# Patient Record
Sex: FEMALE | Race: BLACK OR AFRICAN AMERICAN | NOT HISPANIC OR LATINO | URBAN - METROPOLITAN AREA
[De-identification: names, ages, dates, MRNs, and addresses within clinical notes are randomized per-mention and may not be internally consistent; named-entity substitution may affect disease eponyms.]

---

## 2020-11-19 ENCOUNTER — OFFICE VISIT (OUTPATIENT)
Dept: URBAN - METROPOLITAN AREA CLINIC 118 | Facility: CLINIC | Age: 30
End: 2020-11-19
Payer: COMMERCIAL

## 2020-11-19 DIAGNOSIS — K64.8 INTERNAL HEMORRHOID: ICD-10-CM

## 2020-11-19 DIAGNOSIS — K62.5 RECTAL BLEEDING: ICD-10-CM

## 2020-11-19 PROCEDURE — 46221 LIGATION OF HEMORRHOID(S): CPT | Performed by: INTERNAL MEDICINE

## 2020-11-19 PROCEDURE — G8418 CALC BMI BLW LOW PARAM F/U: HCPCS | Performed by: INTERNAL MEDICINE

## 2020-11-19 PROCEDURE — G8482 FLU IMMUNIZE ORDER/ADMIN: HCPCS | Performed by: INTERNAL MEDICINE

## 2020-11-19 PROCEDURE — G9903 PT SCRN TBCO ID AS NON USER: HCPCS | Performed by: INTERNAL MEDICINE

## 2020-11-19 PROCEDURE — 99203 OFFICE O/P NEW LOW 30 MIN: CPT | Performed by: INTERNAL MEDICINE

## 2020-11-19 PROCEDURE — 1036F TOBACCO NON-USER: CPT | Performed by: INTERNAL MEDICINE

## 2020-11-19 PROCEDURE — G8427 DOCREV CUR MEDS BY ELIG CLIN: HCPCS | Performed by: INTERNAL MEDICINE

## 2020-11-19 NOTE — EXAM-PHYSICAL EXAM
kwan in room as chaperone Rectal exam: normal external exam  Anoscopy performed using self lighted anoscope Grade 1 LL. Posterior facing rectum   Banded LL using Relead system without complication after reviewing R/B/A with the patient

## 2020-11-19 NOTE — HPI-TODAY'S VISIT:
This is a pleasant but anxious 30-year-old female who presents for evaluation of rectal bleeding.  The patient reports that she was eating a lot of fast food intake amount of ibuprofen and not drinking of water she been constipated earlier this year and started to have rectal bleeding.  She reports that she is significant improved her eating habits however she still is having intermittent rectal bleeding She reports she will have bright red blood per rectum with toilet blood on the toilet paper as well as in the bowl none in the stool stool itself is normal she will also sometimes see a clear discharge when she wipes.  She denies any significant abdominal pain change in bowel habits weight loss or change in stool caliber.  She denies family history of personal history colon cancer or colon polyps

## 2020-11-23 ENCOUNTER — OFFICE VISIT (OUTPATIENT)
Dept: URBAN - METROPOLITAN AREA CLINIC 40 | Facility: CLINIC | Age: 30
End: 2020-11-23

## 2021-02-22 ENCOUNTER — OFFICE VISIT (OUTPATIENT)
Dept: URBAN - METROPOLITAN AREA CLINIC 118 | Facility: CLINIC | Age: 31
End: 2021-02-22
Payer: COMMERCIAL

## 2021-02-22 DIAGNOSIS — K64.8 INTERNAL HEMORRHOID: ICD-10-CM

## 2021-02-22 DIAGNOSIS — R10.30 LOWER ABDOMINAL PAIN: ICD-10-CM

## 2021-02-22 DIAGNOSIS — R15.2 FECAL URGENCY: ICD-10-CM

## 2021-02-22 DIAGNOSIS — K62.5 RECTAL BLEEDING: ICD-10-CM

## 2021-02-22 PROCEDURE — 99214 OFFICE O/P EST MOD 30 MIN: CPT | Performed by: INTERNAL MEDICINE

## 2021-02-22 RX ORDER — DICYCLOMINE HYDROCHLORIDE 10 MG/1
1 TABLET CAPSULE ORAL THREE TIMES A DAY
Qty: 90 TABLET | Refills: 1 | OUTPATIENT
Start: 2021-02-22 | End: 2021-04-23

## 2021-02-22 NOTE — HPI-TODAY'S VISIT:
This is a pleasant but anxious 30-year-old female who returns for evaluation of rectal bleeding.  The patient reports that she has been eating much healthier  had been doing better after the hemorrhoid banding x 1 I did then a few weeks later symptoms "went south" constant lower abd pain that is worse with eating and better with BM, cramping, severe, will last until has bm will see blood when wipes about twice a week anal burning

## 2021-03-11 ENCOUNTER — OFFICE VISIT (OUTPATIENT)
Dept: URBAN - METROPOLITAN AREA SURGERY CENTER 23 | Facility: SURGERY CENTER | Age: 31
End: 2021-03-11

## 2021-03-11 RX ORDER — DICYCLOMINE HYDROCHLORIDE 10 MG/1
1 TABLET CAPSULE ORAL THREE TIMES A DAY
Qty: 90 TABLET | Refills: 1 | Status: ACTIVE | COMMUNITY
Start: 2021-02-22 | End: 2021-04-23

## 2021-03-11 RX ORDER — SODIUM, POTASSIUM,MAG SULFATES 17.5-3.13G
177ML SOLUTION, RECONSTITUTED, ORAL ORAL
Qty: 1 KIT | Refills: 0 | OUTPATIENT
Start: 2021-03-11 | End: 2021-03-12

## 2021-03-25 ENCOUNTER — OFFICE VISIT (OUTPATIENT)
Dept: URBAN - METROPOLITAN AREA SURGERY CENTER 23 | Facility: SURGERY CENTER | Age: 31
End: 2021-03-25

## 2021-03-29 ENCOUNTER — OFFICE VISIT (OUTPATIENT)
Dept: URBAN - METROPOLITAN AREA CLINIC 98 | Facility: CLINIC | Age: 31
End: 2021-03-29
Payer: COMMERCIAL

## 2021-03-29 VITALS
HEIGHT: 64 IN | TEMPERATURE: 98 F | HEART RATE: 69 BPM | WEIGHT: 107.6 LBS | BODY MASS INDEX: 18.37 KG/M2 | DIASTOLIC BLOOD PRESSURE: 73 MMHG | SYSTOLIC BLOOD PRESSURE: 108 MMHG

## 2021-03-29 DIAGNOSIS — K59.09 CHRONIC CONSTIPATION: ICD-10-CM

## 2021-03-29 DIAGNOSIS — K62.89 ANORECTAL PAIN: ICD-10-CM

## 2021-03-29 DIAGNOSIS — K92.1 HEMATOCHEZIA: ICD-10-CM

## 2021-03-29 PROCEDURE — 992 APS NON BILLABLE: Performed by: INTERNAL MEDICINE

## 2021-03-29 RX ORDER — DICYCLOMINE HYDROCHLORIDE 10 MG/1
1 TABLET CAPSULE ORAL THREE TIMES A DAY
Qty: 90 TABLET | Refills: 1 | Status: ACTIVE | COMMUNITY
Start: 2021-02-22 | End: 2021-04-23

## 2021-03-29 RX ORDER — LIDOCAINE 50 MG/G
1 APPLICATION AS NEEDED OINTMENT TOPICAL THREE TIMES A DAY
Qty: 1 | Refills: 0 | OUTPATIENT
Start: 2021-03-29

## 2021-03-29 NOTE — EXAM-FUNCTIONAL ASSESSMENT
Constitutional: well-developed, normal communication ability.   Eyes: Conjunctivae and eyelids appear normal, no scleral icterus. Respiratory: symmetric expansion of chest wall, normal work of breathing   Musculoskeletal: normal gait and station   Skin: no jaundice   Neurologic: Oriented to person, place, time. Short term memory intact.    Psychiatric: Normal mood and appropriate affect.

## 2021-03-29 NOTE — HPI-TODAY'S VISIT:
Ms. Melo is a 31 yo F who has been seeing Dr. Sandoval last on 2/22/2021 for rectal bleeding and lower abdominal pain. She has had hemorrhoid banding x 1 with him. She was to start low FODMAP diet and have a colonoscopy (did not prep) rescheduled for 4/8/21.   Since the last visit she has felt some burning in her anus when having a BM. She has felt constipated. She is having a BM every 4-5 days. She has tried fiber supplements and changing her diet with no effect. She has not tried miralax or another laxative. She still sees some red blood on the toilet paper when she wipes most days. Abdominal pain is b/l lower abdomen and 3/10. It is crampy and sharp.

## 2021-04-08 ENCOUNTER — OFFICE VISIT (OUTPATIENT)
Dept: URBAN - METROPOLITAN AREA SURGERY CENTER 23 | Facility: SURGERY CENTER | Age: 31
End: 2021-04-08

## 2021-04-29 ENCOUNTER — OFFICE VISIT (OUTPATIENT)
Dept: URBAN - METROPOLITAN AREA SURGERY CENTER 23 | Facility: SURGERY CENTER | Age: 31
End: 2021-04-29
Payer: COMMERCIAL

## 2021-04-29 DIAGNOSIS — K62.5 ANAL BLEEDING: ICD-10-CM

## 2021-04-29 PROCEDURE — 45378 DIAGNOSTIC COLONOSCOPY: CPT | Performed by: INTERNAL MEDICINE

## 2021-04-29 PROCEDURE — G8907 PT DOC NO EVENTS ON DISCHARG: HCPCS | Performed by: INTERNAL MEDICINE

## 2021-04-29 RX ORDER — LIDOCAINE 50 MG/G
1 APPLICATION AS NEEDED OINTMENT TOPICAL THREE TIMES A DAY
Qty: 1 | Refills: 0 | Status: ACTIVE | COMMUNITY
Start: 2021-03-29

## 2021-06-17 ENCOUNTER — TELEPHONE ENCOUNTER (OUTPATIENT)
Dept: URBAN - METROPOLITAN AREA CLINIC 118 | Facility: CLINIC | Age: 31
End: 2021-06-17

## 2021-06-29 ENCOUNTER — LAB OUTSIDE AN ENCOUNTER (OUTPATIENT)
Dept: URBAN - METROPOLITAN AREA CLINIC 92 | Facility: CLINIC | Age: 31
End: 2021-06-29

## 2021-06-29 ENCOUNTER — TELEPHONE ENCOUNTER (OUTPATIENT)
Dept: URBAN - METROPOLITAN AREA CLINIC 92 | Facility: CLINIC | Age: 31
End: 2021-06-29

## 2021-07-13 ENCOUNTER — CLAIMS CREATED FROM THE CLAIM WINDOW (OUTPATIENT)
Dept: URBAN - METROPOLITAN AREA CLINIC 4 | Facility: CLINIC | Age: 31
End: 2021-07-13
Payer: COMMERCIAL

## 2021-07-13 ENCOUNTER — OFFICE VISIT (OUTPATIENT)
Dept: URBAN - METROPOLITAN AREA SURGERY CENTER 23 | Facility: SURGERY CENTER | Age: 31
End: 2021-07-13
Payer: COMMERCIAL

## 2021-07-13 DIAGNOSIS — B37.81 CANDIDA ESOPHAGITIS: ICD-10-CM

## 2021-07-13 DIAGNOSIS — K29.60 OTHER GASTRITIS WITHOUT BLEEDING: ICD-10-CM

## 2021-07-13 DIAGNOSIS — K21.9 ACID REFLUX: ICD-10-CM

## 2021-07-13 DIAGNOSIS — B37.81 CANDIDAL ESOPHAGITIS: ICD-10-CM

## 2021-07-13 DIAGNOSIS — B96.81 BACTERIAL INFECTION DUE TO H. PYLORI: ICD-10-CM

## 2021-07-13 DIAGNOSIS — K29.60 ADENOPAPILLOMATOSIS GASTRICA: ICD-10-CM

## 2021-07-13 DIAGNOSIS — B96.81 HELICOBACTER PYLORI [H. PYLORI] AS THE CAUSE OF DISEASES CLASSIFIED ELSEWHERE: ICD-10-CM

## 2021-07-13 PROCEDURE — 88341 IMHCHEM/IMCYTCHM EA ADD ANTB: CPT | Performed by: PATHOLOGY

## 2021-07-13 PROCEDURE — G8907 PT DOC NO EVENTS ON DISCHARG: HCPCS | Performed by: INTERNAL MEDICINE

## 2021-07-13 PROCEDURE — 88312 SPECIAL STAINS GROUP 1: CPT | Performed by: PATHOLOGY

## 2021-07-13 PROCEDURE — 88342 IMHCHEM/IMCYTCHM 1ST ANTB: CPT | Performed by: PATHOLOGY

## 2021-07-13 PROCEDURE — 43251 EGD REMOVE LESION SNARE: CPT | Performed by: INTERNAL MEDICINE

## 2021-07-13 PROCEDURE — 88305 TISSUE EXAM BY PATHOLOGIST: CPT | Performed by: PATHOLOGY

## 2021-07-13 PROCEDURE — 43239 EGD BIOPSY SINGLE/MULTIPLE: CPT | Performed by: INTERNAL MEDICINE

## 2021-07-13 RX ORDER — LIDOCAINE 50 MG/G
1 APPLICATION AS NEEDED OINTMENT TOPICAL THREE TIMES A DAY
Qty: 1 | Refills: 0 | Status: ACTIVE | COMMUNITY
Start: 2021-03-29

## 2021-07-13 RX ORDER — CLARITHROMYCIN 500 MG/1
1 TABLET TABLET, FILM COATED ORAL
Qty: 28 TABLET | Refills: 0 | OUTPATIENT
Start: 2021-07-26 | End: 2021-08-09

## 2021-07-13 RX ORDER — OMEPRAZOLE 20 MG/1
1 CAPSULE CAPSULE, DELAYED RELEASE ORAL BID
Qty: 28 CAPSULE | Refills: 0 | OUTPATIENT
Start: 2021-07-26

## 2021-07-13 RX ORDER — AMOXICILLIN 500 MG/1
2 CAPSULES CAPSULE ORAL
Qty: 56 CAPSULE | Refills: 0 | OUTPATIENT
Start: 2021-07-26 | End: 2021-08-09

## 2021-08-09 ENCOUNTER — TELEPHONE ENCOUNTER (OUTPATIENT)
Dept: URBAN - METROPOLITAN AREA CLINIC 92 | Facility: CLINIC | Age: 31
End: 2021-08-09

## 2021-08-18 ENCOUNTER — OFFICE VISIT (OUTPATIENT)
Dept: URBAN - METROPOLITAN AREA CLINIC 118 | Facility: CLINIC | Age: 31
End: 2021-08-18

## 2021-08-18 VITALS — HEIGHT: 64 IN

## 2021-09-10 ENCOUNTER — OFFICE VISIT (OUTPATIENT)
Dept: URBAN - METROPOLITAN AREA CLINIC 109 | Facility: CLINIC | Age: 31
End: 2021-09-10
Payer: COMMERCIAL

## 2021-09-10 ENCOUNTER — LAB OUTSIDE AN ENCOUNTER (OUTPATIENT)
Dept: URBAN - METROPOLITAN AREA CLINIC 109 | Facility: CLINIC | Age: 31
End: 2021-09-10

## 2021-09-10 ENCOUNTER — WEB ENCOUNTER (OUTPATIENT)
Dept: URBAN - METROPOLITAN AREA CLINIC 109 | Facility: CLINIC | Age: 31
End: 2021-09-10

## 2021-09-10 DIAGNOSIS — R64 CACHECTIC: ICD-10-CM

## 2021-09-10 DIAGNOSIS — B37.81 CANDIDA ESOPHAGITIS: ICD-10-CM

## 2021-09-10 DIAGNOSIS — R63.4 ABNORMAL LOSS OF WEIGHT: ICD-10-CM

## 2021-09-10 DIAGNOSIS — A04.8 H. PYLORI INFECTION: ICD-10-CM

## 2021-09-10 PROCEDURE — 99214 OFFICE O/P EST MOD 30 MIN: CPT | Performed by: INTERNAL MEDICINE

## 2021-09-10 RX ORDER — FLUCONAZOLE 200 MG/1
2 TABS ON DAY ONE, THEN 1 TABLET DAILY THEREAFTER TABLET ORAL DAILY
Qty: 22 TABLET | Refills: 0 | OUTPATIENT
Start: 2021-09-10 | End: 2021-10-01

## 2021-09-10 RX ORDER — OMEPRAZOLE 20 MG/1
1 CAPSULE CAPSULE, DELAYED RELEASE ORAL BID
Qty: 28 CAPSULE | Refills: 0 | Status: DISCONTINUED | COMMUNITY
Start: 2021-07-26

## 2021-09-10 RX ORDER — LIDOCAINE 50 MG/G
1 APPLICATION AS NEEDED OINTMENT TOPICAL THREE TIMES A DAY
Qty: 1 | Refills: 0 | Status: DISCONTINUED | COMMUNITY
Start: 2021-03-29

## 2021-09-10 NOTE — HPI-TODAY'S VISIT:
HP pos, so sent in abx, she feels much better overall but still losing weight despite eating well and feeling improving overall also candida, will treat at followup consider testing why has candida  LAST NOTE: Ms. Melo is a 29 yo F who has been seeing Dr. Sandoval last on 2/22/2021 for rectal bleeding and lower abdominal pain. She has had hemorrhoid banding x 1 with him. She was to start low FODMAP diet and have a colonoscopy (did not prep) rescheduled for 4/8/21.   Since the last visit she has felt some burning in her anus when having a BM. She has felt constipated. She is having a BM every 4-5 days. She has tried fiber supplements and changing her diet with no effect. She has not tried miralax or another laxative. She still sees some red blood on the toilet paper when she wipes most days. Abdominal pain is b/l lower abdomen and 3/10. It is crampy and sharp.

## 2021-09-21 LAB
DEAMIDATED GLIADIN ABS, IGA: 4
DEAMIDATED GLIADIN ABS, IGG: 5
ENDOMYSIAL ANTIBODY IGA: NEGATIVE
H. PYLORI STOOL AG, EIA: NEGATIVE
HEMATOCRIT: 37.2
HEMOGLOBIN: 11.8
HIV SCREEN 4TH GENERATION WRFX: NON REACTIVE
IMMUNOGLOBULIN A, QN, SERUM: 133
MCH: 27.1
MCHC: 31.7
MCV: 86
NRBC: (no result)
PLATELETS: 284
RBC: 4.35
RDW: 14.7
T-TRANSGLUTAMINASE (TTG) IGA: 4
T-TRANSGLUTAMINASE (TTG) IGG: 4
T4,FREE(DIRECT): 1.1
TSH: 1.28
WBC: 4.1

## 2021-11-24 ENCOUNTER — OFFICE VISIT (OUTPATIENT)
Dept: URBAN - METROPOLITAN AREA CLINIC 118 | Facility: CLINIC | Age: 31
End: 2021-11-24
Payer: COMMERCIAL

## 2021-11-24 VITALS
HEIGHT: 64 IN | SYSTOLIC BLOOD PRESSURE: 111 MMHG | HEART RATE: 90 BPM | BODY MASS INDEX: 17.48 KG/M2 | DIASTOLIC BLOOD PRESSURE: 73 MMHG | WEIGHT: 102.4 LBS | TEMPERATURE: 98.1 F

## 2021-11-24 DIAGNOSIS — B37.81 CANDIDA ESOPHAGITIS: ICD-10-CM

## 2021-11-24 DIAGNOSIS — Z86.19 HISTORY OF HELICOBACTER PYLORI INFECTION: ICD-10-CM

## 2021-11-24 DIAGNOSIS — R64 CACHECTIC: ICD-10-CM

## 2021-11-24 DIAGNOSIS — R63.4 ABNORMAL LOSS OF WEIGHT: ICD-10-CM

## 2021-11-24 PROCEDURE — 99214 OFFICE O/P EST MOD 30 MIN: CPT | Performed by: INTERNAL MEDICINE

## 2021-11-24 RX ORDER — AMITRIPTYLINE HYDROCHLORIDE 25 MG/1
1 TAB QHS; IF NOT BETTER AND NO SIDE EFFECTS INCREASE TO 2 TABS QHS TABLET, FILM COATED ORAL ONCE A DAY
Qty: 60 | Refills: 1 | OUTPATIENT
Start: 2021-11-24

## 2021-11-24 NOTE — HPI-TODAY'S VISIT:
Last seen 9/2021 for followup from HP pos, so sent in abx, she feels much better overall but still losing weight despite eating well and feeling improving overall also candida, I treated as well   confirmed HP eradication also constipated checked celiac, tTG IgA weakly positive (since had gastritis and candida on the recent EGD 7/2021 didn't test for ceilac) she went gluten fres and reports gained one pound saw rheum, told may have sjogren's   LAST NOTE: Ms. Melo is a 31 yo F who has been seeing Dr. Sandoval last on 2/22/2021 for rectal bleeding and lower abdominal pain. She has had hemorrhoid banding x 1 with him. She was to start low FODMAP diet and have a colonoscopy (did not prep) rescheduled for 4/8/21.   Since the last visit she has felt some burning in her anus when having a BM. She has felt constipated. She is having a BM every 4-5 days. She has tried fiber supplements and changing her diet with no effect. She has not tried miralax or another laxative. She still sees some red blood on the toilet paper when she wipes most days. Abdominal pain is b/l lower abdomen and 3/10. It is crampy and sharp.

## 2021-12-07 ENCOUNTER — OFFICE VISIT (OUTPATIENT)
Dept: URBAN - METROPOLITAN AREA TELEHEALTH 2 | Facility: TELEHEALTH | Age: 31
End: 2021-12-07
Payer: COMMERCIAL

## 2021-12-07 DIAGNOSIS — B37.81 CANDIDA ESOPHAGITIS: ICD-10-CM

## 2021-12-07 DIAGNOSIS — K58.9 IBS (IRRITABLE BOWEL SYNDROME): ICD-10-CM

## 2021-12-07 DIAGNOSIS — R89.4 ABNORMAL CELIAC ANTIBODY PANEL: ICD-10-CM

## 2021-12-07 PROCEDURE — 97802 MEDICAL NUTRITION INDIV IN: CPT | Performed by: DIETITIAN, REGISTERED

## 2021-12-07 RX ORDER — AMITRIPTYLINE HYDROCHLORIDE 25 MG/1
1 TAB QHS; IF NOT BETTER AND NO SIDE EFFECTS INCREASE TO 2 TABS QHS TABLET, FILM COATED ORAL ONCE A DAY
Qty: 60 | Refills: 1 | Status: ACTIVE | COMMUNITY
Start: 2021-11-24

## 2022-01-13 ENCOUNTER — TELEPHONE ENCOUNTER (OUTPATIENT)
Dept: URBAN - METROPOLITAN AREA CLINIC 118 | Facility: CLINIC | Age: 32
End: 2022-01-13

## 2022-01-18 ENCOUNTER — TELEPHONE ENCOUNTER (OUTPATIENT)
Dept: URBAN - METROPOLITAN AREA CLINIC 118 | Facility: CLINIC | Age: 32
End: 2022-01-18

## 2022-02-28 ENCOUNTER — OFFICE VISIT (OUTPATIENT)
Dept: URBAN - METROPOLITAN AREA CLINIC 118 | Facility: CLINIC | Age: 32
End: 2022-02-28
Payer: COMMERCIAL

## 2022-02-28 VITALS
SYSTOLIC BLOOD PRESSURE: 122 MMHG | HEART RATE: 84 BPM | DIASTOLIC BLOOD PRESSURE: 80 MMHG | HEIGHT: 64 IN | WEIGHT: 103 LBS | TEMPERATURE: 97.5 F | BODY MASS INDEX: 17.58 KG/M2

## 2022-02-28 DIAGNOSIS — B37.81 CANDIDA ESOPHAGITIS: ICD-10-CM

## 2022-02-28 DIAGNOSIS — Z86.19 HISTORY OF HELICOBACTER PYLORI INFECTION: ICD-10-CM

## 2022-02-28 DIAGNOSIS — R64 CACHECTIC: ICD-10-CM

## 2022-02-28 DIAGNOSIS — R63.4 ABNORMAL LOSS OF WEIGHT: ICD-10-CM

## 2022-02-28 PROCEDURE — 99213 OFFICE O/P EST LOW 20 MIN: CPT | Performed by: INTERNAL MEDICINE

## 2022-02-28 RX ORDER — AMITRIPTYLINE HYDROCHLORIDE 25 MG/1
1 TAB QHS; IF NOT BETTER AND NO SIDE EFFECTS INCREASE TO 2 TABS QHS TABLET, FILM COATED ORAL ONCE A DAY
Qty: 60 | Refills: 1 | Status: ON HOLD | COMMUNITY
Start: 2021-11-24
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
## 2022-08-08 ENCOUNTER — HOSPITAL ENCOUNTER (EMERGENCY)
Dept: HOSPITAL 5 - ED | Age: 32
Discharge: LEFT BEFORE DISCHARGE | End: 2022-08-08
Payer: SELF-PAY

## 2022-08-08 VITALS — DIASTOLIC BLOOD PRESSURE: 45 MMHG | SYSTOLIC BLOOD PRESSURE: 117 MMHG

## 2022-08-08 DIAGNOSIS — K08.89: Primary | ICD-10-CM

## 2022-08-08 DIAGNOSIS — Z53.21: ICD-10-CM

## 2022-10-06 ENCOUNTER — TELEPHONE ENCOUNTER (OUTPATIENT)
Dept: URBAN - METROPOLITAN AREA CLINIC 27 | Facility: CLINIC | Age: 32
End: 2022-10-06

## 2022-10-06 ENCOUNTER — OFFICE VISIT (OUTPATIENT)
Dept: URBAN - METROPOLITAN AREA CLINIC 27 | Facility: CLINIC | Age: 32
End: 2022-10-06
Payer: COMMERCIAL

## 2022-10-06 ENCOUNTER — LAB OUTSIDE AN ENCOUNTER (OUTPATIENT)
Dept: URBAN - METROPOLITAN AREA CLINIC 27 | Facility: CLINIC | Age: 32
End: 2022-10-06

## 2022-10-06 ENCOUNTER — WEB ENCOUNTER (OUTPATIENT)
Dept: URBAN - METROPOLITAN AREA CLINIC 27 | Facility: CLINIC | Age: 32
End: 2022-10-06

## 2022-10-06 VITALS
WEIGHT: 103 LBS | DIASTOLIC BLOOD PRESSURE: 68 MMHG | BODY MASS INDEX: 17.58 KG/M2 | HEIGHT: 64 IN | HEART RATE: 75 BPM | SYSTOLIC BLOOD PRESSURE: 103 MMHG | RESPIRATION RATE: 17 BRPM | TEMPERATURE: 96.6 F

## 2022-10-06 DIAGNOSIS — R10.12 LEFT UPPER QUADRANT ABDOMINAL PAIN: ICD-10-CM

## 2022-10-06 PROCEDURE — 99204 OFFICE O/P NEW MOD 45 MIN: CPT | Performed by: INTERNAL MEDICINE

## 2022-10-06 RX ORDER — OMEPRAZOLE 40 MG/1
1 CAPSULE 30 MINUTES BEFORE MORNING MEAL CAPSULE, DELAYED RELEASE ORAL ONCE A DAY
Qty: 30 | Refills: 5
Start: 2022-10-06

## 2022-10-06 RX ORDER — OMEPRAZOLE 40 MG/1
1 CAPSULE 30 MINUTES BEFORE MORNING MEAL CAPSULE, DELAYED RELEASE ORAL ONCE A DAY
Qty: 30 | Refills: 5 | OUTPATIENT
Start: 2022-10-06

## 2022-10-06 RX ORDER — AMITRIPTYLINE HYDROCHLORIDE 25 MG/1
1 TAB QHS; IF NOT BETTER AND NO SIDE EFFECTS INCREASE TO 2 TABS QHS TABLET, FILM COATED ORAL ONCE A DAY
Qty: 60 | Refills: 1 | Status: ON HOLD | COMMUNITY
Start: 2021-11-24

## 2022-10-06 NOTE — HPI-TODAY'S VISIT:
32-year-old female here for abdominal pain.  Pain is predominantly in the left upper quadrant.  She has a history of H. pylori diagnosed by EGD last year.  She was treated with triple therapy and stool test confirmed eradication after completing antibiotics.  Symptoms returned about 2 to 3 weeks ago and are very similar to when she had H. pylori.  She has had occasional rectal bleeding.  She has a history of hemorrhoids, underwent banding about 2 years ago.  Last colonoscopy was last year that showed small internal hemorrhoids.  No family history of colon cancer. Had CT earlier this year that was normal.

## 2022-10-10 ENCOUNTER — LAB OUTSIDE AN ENCOUNTER (OUTPATIENT)
Dept: URBAN - METROPOLITAN AREA CLINIC 27 | Facility: CLINIC | Age: 32
End: 2022-10-10

## 2022-10-10 ENCOUNTER — CLAIMS CREATED FROM THE CLAIM WINDOW (OUTPATIENT)
Dept: URBAN - METROPOLITAN AREA SURGERY CENTER 7 | Facility: SURGERY CENTER | Age: 32
End: 2022-10-10

## 2022-10-10 ENCOUNTER — CLAIMS CREATED FROM THE CLAIM WINDOW (OUTPATIENT)
Dept: URBAN - METROPOLITAN AREA SURGERY CENTER 7 | Facility: SURGERY CENTER | Age: 32
End: 2022-10-10
Payer: COMMERCIAL

## 2022-10-10 ENCOUNTER — TELEPHONE ENCOUNTER (OUTPATIENT)
Dept: URBAN - METROPOLITAN AREA CLINIC 27 | Facility: CLINIC | Age: 32
End: 2022-10-10

## 2022-10-10 DIAGNOSIS — K29.60 ADENOPAPILLOMATOSIS GASTRICA: ICD-10-CM

## 2022-10-10 DIAGNOSIS — K22.9 ABNORMALITY OF ESOPHAGUS: ICD-10-CM

## 2022-10-10 DIAGNOSIS — R10.12 ABDOMINAL BURNING SENSATION IN LEFT UPPER QUADRANT: ICD-10-CM

## 2022-10-10 PROCEDURE — 43239 EGD BIOPSY SINGLE/MULTIPLE: CPT | Performed by: INTERNAL MEDICINE

## 2022-10-10 PROCEDURE — G8907 PT DOC NO EVENTS ON DISCHARG: HCPCS | Performed by: INTERNAL MEDICINE

## 2022-10-10 RX ORDER — AMITRIPTYLINE HYDROCHLORIDE 25 MG/1
1 TAB QHS; IF NOT BETTER AND NO SIDE EFFECTS INCREASE TO 2 TABS QHS TABLET, FILM COATED ORAL ONCE A DAY
Qty: 60 | Refills: 1 | Status: ON HOLD | COMMUNITY
Start: 2021-11-24

## 2022-10-10 RX ORDER — OMEPRAZOLE 40 MG/1
1 CAPSULE 30 MINUTES BEFORE MORNING MEAL CAPSULE, DELAYED RELEASE ORAL ONCE A DAY
Qty: 30 | Refills: 5 | Status: ACTIVE | COMMUNITY
Start: 2022-10-06

## 2022-10-10 RX ORDER — FLUCONAZOLE 200 MG/1
1 TABLET TABLET ORAL DAILY
Qty: 14 TABLET | Refills: 0 | OUTPATIENT
Start: 2022-10-10 | End: 2022-10-24

## 2022-10-11 LAB
A/G RATIO: 1.8
ABSOLUTE BASOPHILS: 19
ABSOLUTE EOSINOPHILS: 103
ABSOLUTE LYMPHOCYTES: 1794
ABSOLUTE MONOCYTES: 194
ABSOLUTE NEUTROPHILS: 1691
ALBUMIN: 4.2
ALKALINE PHOSPHATASE: 33
ALT (SGPT): 14
AST (SGOT): 16
BASOPHILS: 0.5
BILIRUBIN, TOTAL: 0.7
BUN/CREATININE RATIO: (no result)
BUN: 12
CALCIUM: 8.6
CARBON DIOXIDE, TOTAL: 23
CHLORIDE: 106
CREATININE: 0.67
EGFR: 120
EOSINOPHILS: 2.7
GLOBULIN, TOTAL: 2.4
GLUCOSE: 74
HEMATOCRIT: 35.9
HEMOGLOBIN: 11.7
HIV AG/AB, 4TH GEN: (no result)
LYMPHOCYTES: 47.2
MCH: 28.5
MCHC: 32.6
MCV: 87.3
MONOCYTES: 5.1
MPV: 10
NEUTROPHILS: 44.5
PLATELET COUNT: 274
POTASSIUM: 4.1
PROTEIN, TOTAL: 6.6
RDW: 14.2
RED BLOOD CELL COUNT: 4.11
SODIUM: 139
TSH: 3.21
WHITE BLOOD CELL COUNT: 3.8

## 2023-02-15 ENCOUNTER — OFFICE VISIT (OUTPATIENT)
Dept: URBAN - METROPOLITAN AREA CLINIC 109 | Facility: CLINIC | Age: 33
End: 2023-02-15
Payer: COMMERCIAL

## 2023-02-15 VITALS
SYSTOLIC BLOOD PRESSURE: 106 MMHG | HEIGHT: 64 IN | TEMPERATURE: 97 F | HEART RATE: 92 BPM | BODY MASS INDEX: 17.69 KG/M2 | WEIGHT: 103.6 LBS | DIASTOLIC BLOOD PRESSURE: 65 MMHG

## 2023-02-15 DIAGNOSIS — D84.9 IMMUNOSUPPRESSION: ICD-10-CM

## 2023-02-15 DIAGNOSIS — R13.10 ODYNOPHAGIA: ICD-10-CM

## 2023-02-15 DIAGNOSIS — Z86.19 HISTORY OF CANDIDIASIS: ICD-10-CM

## 2023-02-15 PROBLEM — 38013005: Status: ACTIVE | Noted: 2023-02-15

## 2023-02-15 PROBLEM — 161413004: Status: ACTIVE | Noted: 2023-02-15

## 2023-02-15 PROBLEM — 30233002: Status: ACTIVE | Noted: 2023-02-15

## 2023-02-15 PROCEDURE — 99214 OFFICE O/P EST MOD 30 MIN: CPT | Performed by: INTERNAL MEDICINE

## 2023-02-15 RX ORDER — FLUCONAZOLE 200 MG/1
1 TABLET TABLET ORAL ONCE DAILY
Qty: 14 | Refills: 0 | OUTPATIENT

## 2023-02-15 RX ORDER — OMEPRAZOLE 40 MG/1
1 CAPSULE 30 MINUTES BEFORE MORNING MEAL CAPSULE, DELAYED RELEASE ORAL ONCE A DAY
Qty: 30 | Refills: 5 | Status: ON HOLD | COMMUNITY
Start: 2022-10-06

## 2023-02-15 RX ORDER — AMITRIPTYLINE HYDROCHLORIDE 25 MG/1
1 TAB QHS; IF NOT BETTER AND NO SIDE EFFECTS INCREASE TO 2 TABS QHS TABLET, FILM COATED ORAL ONCE A DAY
Qty: 60 | Refills: 1 | COMMUNITY
Start: 2021-11-24

## 2023-02-15 NOTE — HPI-TODAY'S VISIT:
The patient presents for f/u appt.  has seen other providers in practice in past few years.  h/o HP gastritis, and recently dx with candida esophagitis from endoscopy last year.  She was treated with course of fluconazole.  she had odynophagia sx's previously that resolved after fluconazole course, but sx's returned 2-3 weeks ago.  + pain with swallowing, similar to prior sx's.  h/o sjogren's syndrome, on plaquenil.  weight stable from last appt.  denies bowel habit changes, abd pain or gi bleeding.

## 2023-03-08 ENCOUNTER — OFFICE VISIT (OUTPATIENT)
Dept: URBAN - METROPOLITAN AREA CLINIC 109 | Facility: CLINIC | Age: 33
End: 2023-03-08

## 2023-03-08 RX ORDER — AMITRIPTYLINE HYDROCHLORIDE 25 MG/1
1 TAB QHS; IF NOT BETTER AND NO SIDE EFFECTS INCREASE TO 2 TABS QHS TABLET, FILM COATED ORAL ONCE A DAY
Qty: 60 | Refills: 1 | COMMUNITY
Start: 2021-11-24

## 2023-03-08 RX ORDER — OMEPRAZOLE 40 MG/1
1 CAPSULE 30 MINUTES BEFORE MORNING MEAL CAPSULE, DELAYED RELEASE ORAL ONCE A DAY
Qty: 30 | Refills: 5 | Status: ON HOLD | COMMUNITY
Start: 2022-10-06

## 2023-03-08 RX ORDER — FLUCONAZOLE 200 MG/1
1 TABLET TABLET ORAL ONCE DAILY
Qty: 14 | Refills: 0 | Status: ACTIVE | COMMUNITY

## 2023-07-06 ENCOUNTER — OFFICE VISIT (OUTPATIENT)
Dept: URBAN - METROPOLITAN AREA CLINIC 25 | Facility: CLINIC | Age: 33
End: 2023-07-06
Payer: COMMERCIAL

## 2023-07-06 ENCOUNTER — WEB ENCOUNTER (OUTPATIENT)
Dept: URBAN - METROPOLITAN AREA CLINIC 25 | Facility: CLINIC | Age: 33
End: 2023-07-06

## 2023-07-06 VITALS
HEIGHT: 64 IN | BODY MASS INDEX: 18.13 KG/M2 | TEMPERATURE: 97.5 F | WEIGHT: 106.2 LBS | DIASTOLIC BLOOD PRESSURE: 64 MMHG | SYSTOLIC BLOOD PRESSURE: 103 MMHG | HEART RATE: 80 BPM

## 2023-07-06 DIAGNOSIS — M35.08 SJOGREN SYNDROME WITH GASTROINTESTINAL INVOLVEMENT: ICD-10-CM

## 2023-07-06 DIAGNOSIS — K62.5 RECTAL BLEEDING: ICD-10-CM

## 2023-07-06 DIAGNOSIS — K59.04 CHRONIC IDIOPATHIC CONSTIPATION: ICD-10-CM

## 2023-07-06 DIAGNOSIS — B37.81 ESOPHAGEAL CANDIDIASIS: ICD-10-CM

## 2023-07-06 PROBLEM — 82934008: Status: ACTIVE | Noted: 2023-07-06

## 2023-07-06 PROCEDURE — 99214 OFFICE O/P EST MOD 30 MIN: CPT | Performed by: INTERNAL MEDICINE

## 2023-07-06 RX ORDER — POLYETHYLENE GLYCOL 3350, SODIUM SULFATE ANHYDROUS, SODIUM BICARBONATE, SODIUM CHLORIDE, POTASSIUM CHLORIDE 236; 22.74; 6.74; 5.86; 2.97 G/4L; G/4L; G/4L; G/4L; G/4L
AS DIRECTED POWDER, FOR SOLUTION ORAL
Qty: 1 | Refills: 0 | OUTPATIENT
Start: 2023-07-06 | End: 2023-07-07

## 2023-07-06 RX ORDER — FLUCONAZOLE 200 MG/1
TAKE 2 TABLETS ON DAY 1 AND THEN 1 TABLET DAILY FOR 14 DAYS TABLET ORAL DAILY
Qty: 15 | Refills: 1 | OUTPATIENT
Start: 2023-07-06 | End: 2023-08-03

## 2023-07-06 RX ORDER — AMITRIPTYLINE HYDROCHLORIDE 25 MG/1
1 TAB QHS; IF NOT BETTER AND NO SIDE EFFECTS INCREASE TO 2 TABS QHS TABLET, FILM COATED ORAL ONCE A DAY
Qty: 60 | Refills: 1 | Status: ON HOLD | COMMUNITY
Start: 2021-11-24

## 2023-07-06 RX ORDER — OMEPRAZOLE 40 MG/1
1 CAPSULE 30 MINUTES BEFORE MORNING MEAL CAPSULE, DELAYED RELEASE ORAL ONCE A DAY
Qty: 30 | Refills: 5 | Status: ON HOLD | COMMUNITY
Start: 2022-10-06

## 2023-07-06 RX ORDER — FLUCONAZOLE 200 MG/1
1 TABLET TABLET ORAL ONCE DAILY
Qty: 14 | Refills: 0 | Status: ON HOLD | COMMUNITY

## 2023-07-06 NOTE — HPI-TODAY'S VISIT:
Ms. Melo is a 32y F, she presents today w/ LLQ abdominal pain, she confirms a hx of candidal esophagitis and she reports odynophagia w/ hx of Sjogren's  (+) Odynophagia  - Patient reports pain swallowing both solids and liquids, she only gets relief from her pain when she is sleeping, she notes oral thrush currently and states that she has continuously tried to maintain moisture in hr throat  - The last episode of candidal esophagitis was in October and she was treated w/ nystatin  - Decreased appetite and notes weight loss out of fear of inducing pain  - Patient denies heartburn or acid reflux symtpoms  (+) Abdominal pain  - Patient reports a few days w/o movement, when she does have a BM she will occasionally note rectal bleeding as well  - 2-3 BM's a week, stools are usually soft and denies straining  - Abdominal pain is described as punching in the stomach, and cramping-like pain  - Rectal bleeding occurs 2-3 times a week, blood is only when she wipes and states it is relatively scant  - Patient has not tried anything for her constipation, has attempted to increase water intake w/o much relief  - Denies famhx of colon CA/Polyps, unintentional weight loss, melena,

## 2023-07-17 ENCOUNTER — OFFICE VISIT (OUTPATIENT)
Dept: URBAN - METROPOLITAN AREA CLINIC 25 | Facility: CLINIC | Age: 33
End: 2023-07-17

## 2023-07-24 ENCOUNTER — OFFICE VISIT (OUTPATIENT)
Dept: URBAN - METROPOLITAN AREA SURGERY CENTER 20 | Facility: SURGERY CENTER | Age: 33
End: 2023-07-24

## 2023-07-24 ENCOUNTER — TELEPHONE ENCOUNTER (OUTPATIENT)
Dept: URBAN - METROPOLITAN AREA CLINIC 25 | Facility: CLINIC | Age: 33
End: 2023-07-24

## 2023-08-23 ENCOUNTER — TELEPHONE ENCOUNTER (OUTPATIENT)
Dept: URBAN - METROPOLITAN AREA CLINIC 25 | Facility: CLINIC | Age: 33
End: 2023-08-23

## 2023-09-01 ENCOUNTER — OFFICE VISIT (OUTPATIENT)
Dept: URBAN - METROPOLITAN AREA CLINIC 25 | Facility: CLINIC | Age: 33
End: 2023-09-01

## 2023-09-01 RX ORDER — AMITRIPTYLINE HYDROCHLORIDE 25 MG/1
1 TAB QHS; IF NOT BETTER AND NO SIDE EFFECTS INCREASE TO 2 TABS QHS TABLET, FILM COATED ORAL ONCE A DAY
Qty: 60 | Refills: 1 | Status: ON HOLD | COMMUNITY
Start: 2021-11-24

## 2023-09-01 RX ORDER — FLUCONAZOLE 200 MG/1
1 TABLET TABLET ORAL ONCE DAILY
Qty: 14 | Refills: 0 | Status: ON HOLD | COMMUNITY

## 2023-09-01 RX ORDER — OMEPRAZOLE 40 MG/1
1 CAPSULE 30 MINUTES BEFORE MORNING MEAL CAPSULE, DELAYED RELEASE ORAL ONCE A DAY
Qty: 30 | Refills: 5 | Status: ON HOLD | COMMUNITY
Start: 2022-10-06

## 2023-09-06 ENCOUNTER — OFFICE VISIT (OUTPATIENT)
Dept: URBAN - METROPOLITAN AREA CLINIC 25 | Facility: CLINIC | Age: 33
End: 2023-09-06

## 2023-09-15 ENCOUNTER — OFFICE VISIT (OUTPATIENT)
Dept: URBAN - METROPOLITAN AREA CLINIC 25 | Facility: CLINIC | Age: 33
End: 2023-09-15

## 2023-09-15 RX ORDER — FLUCONAZOLE 200 MG/1
1 TABLET TABLET ORAL ONCE DAILY
Qty: 14 | Refills: 0 | Status: ON HOLD | COMMUNITY

## 2023-09-15 RX ORDER — AMITRIPTYLINE HYDROCHLORIDE 25 MG/1
1 TAB QHS; IF NOT BETTER AND NO SIDE EFFECTS INCREASE TO 2 TABS QHS TABLET, FILM COATED ORAL ONCE A DAY
Qty: 60 | Refills: 1 | Status: ON HOLD | COMMUNITY
Start: 2021-11-24

## 2023-09-15 RX ORDER — OMEPRAZOLE 40 MG/1
1 CAPSULE 30 MINUTES BEFORE MORNING MEAL CAPSULE, DELAYED RELEASE ORAL ONCE A DAY
Qty: 30 | Refills: 5 | Status: ON HOLD | COMMUNITY
Start: 2022-10-06

## 2023-09-25 ENCOUNTER — TELEPHONE ENCOUNTER (OUTPATIENT)
Dept: URBAN - METROPOLITAN AREA CLINIC 25 | Facility: CLINIC | Age: 33
End: 2023-09-25

## 2023-10-30 ENCOUNTER — OFFICE VISIT (OUTPATIENT)
Dept: URBAN - METROPOLITAN AREA SURGERY CENTER 20 | Facility: SURGERY CENTER | Age: 33
End: 2023-10-30

## 2023-12-05 ENCOUNTER — WEB ENCOUNTER (OUTPATIENT)
Dept: URBAN - METROPOLITAN AREA SURGERY CENTER 7 | Facility: SURGERY CENTER | Age: 33
End: 2023-12-05

## 2023-12-22 ENCOUNTER — OFFICE VISIT (OUTPATIENT)
Dept: URBAN - METROPOLITAN AREA CLINIC 92 | Facility: CLINIC | Age: 33
End: 2023-12-22
Payer: COMMERCIAL

## 2023-12-22 VITALS
HEART RATE: 108 BPM | TEMPERATURE: 97 F | HEIGHT: 64 IN | BODY MASS INDEX: 17.42 KG/M2 | WEIGHT: 102 LBS | DIASTOLIC BLOOD PRESSURE: 82 MMHG | SYSTOLIC BLOOD PRESSURE: 117 MMHG

## 2023-12-22 DIAGNOSIS — R10.30 LOWER ABDOMINAL PAIN: ICD-10-CM

## 2023-12-22 DIAGNOSIS — K59.04 CHRONIC IDIOPATHIC CONSTIPATION: ICD-10-CM

## 2023-12-22 DIAGNOSIS — B37.81 ESOPHAGEAL CANDIDIASIS: ICD-10-CM

## 2023-12-22 DIAGNOSIS — K62.5 RECTAL BLEEDING: ICD-10-CM

## 2023-12-22 DIAGNOSIS — M35.08 SJOGREN SYNDROME WITH GASTROINTESTINAL INVOLVEMENT: ICD-10-CM

## 2023-12-22 DIAGNOSIS — R13.10 ODYNOPHAGIA: ICD-10-CM

## 2023-12-22 PROCEDURE — 99214 OFFICE O/P EST MOD 30 MIN: CPT

## 2023-12-22 RX ORDER — FLUCONAZOLE 200 MG/1
1 TABLET TABLET ORAL ONCE DAILY
Qty: 14 | Refills: 0 | Status: ON HOLD | COMMUNITY

## 2023-12-22 RX ORDER — OMEPRAZOLE 40 MG/1
1 CAPSULE 30 MINUTES BEFORE MORNING MEAL CAPSULE, DELAYED RELEASE ORAL ONCE A DAY
Qty: 30 | Refills: 5 | Status: ON HOLD | COMMUNITY
Start: 2022-10-06

## 2023-12-22 RX ORDER — AMITRIPTYLINE HYDROCHLORIDE 25 MG/1
1 TAB QHS; IF NOT BETTER AND NO SIDE EFFECTS INCREASE TO 2 TABS QHS TABLET, FILM COATED ORAL ONCE A DAY
Qty: 60 | Refills: 1 | Status: ON HOLD | COMMUNITY
Start: 2021-11-24

## 2023-12-22 RX ORDER — FLUCONAZOLE 200 MG/1
1 TABLET TABLET ORAL ONCE DAILY
Qty: 14 | Refills: 0
End: 2024-01-05

## 2023-12-22 NOTE — HPI-TODAY'S VISIT:
Patient is a 33 year old female with a PMH of Sjogren's and candidal esophagitis who presents in follow up for abdominal pain. Patient was previously seen in July by MAYTE Bui.   7/06/23 (+) Odynophagia  - Patient reports pain swallowing both solids and liquids, she only gets relief from her pain when she is sleeping, she notes oral thrush currently and states that she has continuously tried to maintain moisture in hr throat  - The last episode of candidal esophagitis was in October and she was treated w/ nystatin  - Decreased appetite and notes weight loss out of fear of inducing pain  - Patient denies heartburn or acid reflux symtpoms  (+) Abdominal pain  - Patient reports a few days w/o movement, when she does have a BM she will occasionally note rectal bleeding as well  - 2-3 BM's a week, stools are usually soft and denies straining  - Abdominal pain is described as punching in the stomach, and cramping-like pain  - Rectal bleeding occurs 2-3 times a week, blood is only when she wipes and states it is relatively scant  - Patient has not tried anything for her constipation, has attempted to increase water intake w/o much relief  - Denies famhx of colon CA/Polyps, unintentional weight loss, melena.  EGD on 10/10/22 with Dr. Zeb Aguila at [facility] revealed gastritis and esophageal plaques suspicious for candidiasis, stomach bx shows gastric oxyntic and antral type mucosa with chronic inactive inflammation and IM, negative for dysplasia, GE bx shows bacterialcolonization, positive for fungal organisms. EGD on 7/13/21 with Dr. Sandoval revealed white nummular lesions in esophageal, z-line irregular, GE junction polyps, gastritis, bx revealed H. pylori and candida esophagitis, neg. for EOE or Moise's.  Colonoscopy on 4/29/21 with Dr. Sandoval revealed IH, otherwise normal, no specimens collected  Today, patient reports she developed lower abdominal pain a day and a half ago. Worse than the pain before and describes it as a burning sensation. Pain worsens when she lies down, sits, or breaths. Patient then states she is feeling a lot better today. Patient notes she took a lot of pills that may have precipitated the pain, she took elderberry, plaquenil, probiotics, and danae. Patient notes of occasional bright red blood on the toilet tissue. Patient had fevers 5-6 days ago. BM are regular if she takes her nutrition shake, gets better sleep, avoids fried foods. Now having two BM QD. Denies diarrhea or melena. She was not able to have a colonoscopy and EGD as previously recommended, because she did not have a  but now she does.   Admits to awful reflux with mild odynophagia due to recurrent esophageal candida and thrush, she experienced these symptoms prior. Patient took Flucanazole after last visit and felt better. Denies nausea or vomiting.

## 2024-01-11 ENCOUNTER — TELEPHONE ENCOUNTER (OUTPATIENT)
Dept: URBAN - METROPOLITAN AREA CLINIC 92 | Facility: CLINIC | Age: 34
End: 2024-01-11

## 2024-01-16 ENCOUNTER — CLAIMS CREATED FROM THE CLAIM WINDOW (OUTPATIENT)
Dept: URBAN - METROPOLITAN AREA SURGERY CENTER 16 | Facility: SURGERY CENTER | Age: 34
End: 2024-01-16
Payer: COMMERCIAL

## 2024-01-16 DIAGNOSIS — K29.60 ADENOPAPILLOMATOSIS GASTRICA: ICD-10-CM

## 2024-01-16 DIAGNOSIS — R13.19 CERVICAL DYSPHAGIA: ICD-10-CM

## 2024-01-16 DIAGNOSIS — K62.5 ANAL BLEEDING: ICD-10-CM

## 2024-01-16 DIAGNOSIS — K31.89 ACHYLIA: ICD-10-CM

## 2024-01-16 DIAGNOSIS — K64.8 EXTERNAL HEMORRHOIDS: ICD-10-CM

## 2024-01-16 PROCEDURE — 00813 ANES UPR LWR GI NDSC PX: CPT | Performed by: NURSE ANESTHETIST, CERTIFIED REGISTERED

## 2024-01-16 PROCEDURE — 45378 DIAGNOSTIC COLONOSCOPY: CPT | Performed by: INTERNAL MEDICINE

## 2024-01-16 PROCEDURE — 43239 EGD BIOPSY SINGLE/MULTIPLE: CPT | Performed by: INTERNAL MEDICINE

## 2024-01-16 PROCEDURE — 00813 ANES UPR LWR GI NDSC PX: CPT | Performed by: ANESTHESIOLOGY

## 2024-01-16 PROCEDURE — G8907 PT DOC NO EVENTS ON DISCHARG: HCPCS | Performed by: INTERNAL MEDICINE

## 2024-01-16 RX ORDER — OMEPRAZOLE 40 MG/1
1 CAPSULE 30 MINUTES BEFORE MORNING MEAL CAPSULE, DELAYED RELEASE ORAL ONCE A DAY
Qty: 30 | Refills: 5 | Status: ON HOLD | COMMUNITY
Start: 2022-10-06

## 2024-01-16 RX ORDER — AMITRIPTYLINE HYDROCHLORIDE 25 MG/1
1 TAB QHS; IF NOT BETTER AND NO SIDE EFFECTS INCREASE TO 2 TABS QHS TABLET, FILM COATED ORAL ONCE A DAY
Qty: 60 | Refills: 1 | Status: ON HOLD | COMMUNITY
Start: 2021-11-24

## 2024-02-09 ENCOUNTER — OV EP (OUTPATIENT)
Dept: URBAN - METROPOLITAN AREA CLINIC 92 | Facility: CLINIC | Age: 34
End: 2024-02-09

## 2024-02-09 RX ORDER — AMITRIPTYLINE HYDROCHLORIDE 25 MG/1
1 TAB QHS; IF NOT BETTER AND NO SIDE EFFECTS INCREASE TO 2 TABS QHS TABLET, FILM COATED ORAL ONCE A DAY
Qty: 60 | Refills: 1 | COMMUNITY
Start: 2021-11-24

## 2024-02-09 RX ORDER — OMEPRAZOLE 40 MG/1
1 CAPSULE 30 MINUTES BEFORE MORNING MEAL CAPSULE, DELAYED RELEASE ORAL ONCE A DAY
Qty: 30 | Refills: 5 | COMMUNITY
Start: 2022-10-06

## 2024-02-09 NOTE — HPI-TODAY'S VISIT:
Patient is a 33 year old female with a PMH of Sjogren's and candidal esophagitis who presents in follow up for abdominal pain. Patient was previously seen in July by MAYTE Bui.   7/06/23 (+) Odynophagia  - Patient reports pain swallowing both solids and liquids, she only gets relief from her pain when she is sleeping, she notes oral thrush currently and states that she has continuously tried to maintain moisture in hr throat  - The last episode of candidal esophagitis was in October and she was treated w/ nystatin  - Decreased appetite and notes weight loss out of fear of inducing pain  - Patient denies heartburn or acid reflux symtpoms  (+) Abdominal pain  - Patient reports a few days w/o movement, when she does have a BM she will occasionally note rectal bleeding as well  - 2-3 BM's a week, stools are usually soft and denies straining  - Abdominal pain is described as punching in the stomach, and cramping-like pain  - Rectal bleeding occurs 2-3 times a week, blood is only when she wipes and states it is relatively scant  - Patient has not tried anything for her constipation, has attempted to increase water intake w/o much relief  - Denies famhx of colon CA/Polyps, unintentional weight loss, melena.  EGD on 10/10/22 with Dr. Zeb Aguila at [facility] revealed gastritis and esophageal plaques suspicious for candidiasis, stomach bx shows gastric oxyntic and antral type mucosa with chronic inactive inflammation and IM, negative for dysplasia, GE bx shows bacterialcolonization, positive for fungal organisms. EGD on 7/13/21 with Dr. Sandoval revealed white nummular lesions in esophageal, z-line irregular, GE junction polyps, gastritis, bx revealed H. pylori and candida esophagitis, neg. for EOE or Moise's.  Colonoscopy on 4/29/21 with Dr. Sandoval revealed IH, otherwise normal, no specimens collected. Colonoscopy on 1/16/24 with Dr Cruz revealed IH, no specimens collected, screening at age 46. EGD on 1/16/24 unremarkable, bx revealmild active gastritis , neg. for H. pylori   Today, patient reports she developed lower abdominal pain a day and a half ago. Worse than the pain before and describes it as a burning sensation. Pain worsens when she lies down, sits, or breaths. Patient then states she is feeling a lot better today. Patient notes she took a lot of pills that may have precipitated the pain, she took elderberry, plaquenil, probiotics, and danae. Patient notes of occasional bright red blood on the toilet tissue. Patient had fevers 5-6 days ago. BM are regular if she takes her nutrition shake, gets better sleep, avoids fried foods. Now having two BM QD. Denies diarrhea or melena. She was not able to have a colonoscopy and EGD as previously recommended, because she did not have a  but now she does.   Admits to awful reflux with mild odynophagia due to recurrent esophageal candida and thrush, she experienced these symptoms prior. Patient took Flucanazole after last visit and felt better. Denies nausea or vomiting.

## 2024-02-26 ENCOUNTER — OV EP (OUTPATIENT)
Dept: URBAN - METROPOLITAN AREA CLINIC 92 | Facility: CLINIC | Age: 34
End: 2024-02-26

## 2024-02-26 RX ORDER — AMITRIPTYLINE HYDROCHLORIDE 25 MG/1
1 TAB QHS; IF NOT BETTER AND NO SIDE EFFECTS INCREASE TO 2 TABS QHS TABLET, FILM COATED ORAL ONCE A DAY
Qty: 60 | Refills: 1 | COMMUNITY
Start: 2021-11-24

## 2024-02-26 RX ORDER — OMEPRAZOLE 40 MG/1
1 CAPSULE 30 MINUTES BEFORE MORNING MEAL CAPSULE, DELAYED RELEASE ORAL ONCE A DAY
Qty: 30 | Refills: 5 | COMMUNITY
Start: 2022-10-06

## 2024-02-26 NOTE — HPI-TODAY'S VISIT:
Patient is a 33 year old female with a PMH of Sjogren's and candidal esophagitis who presents in follow up for abdominal pain. Patient was previously seen in July by MAYTE Bui.   7/06/23 (+) Odynophagia  - Patient reports pain swallowing both solids and liquids, she only gets relief from her pain when she is sleeping, she notes oral thrush currently and states that she has continuously tried to maintain moisture in hr throat  - The last episode of candidal esophagitis was in October and she was treated w/ nystatin  - Decreased appetite and notes weight loss out of fear of inducing pain  - Patient denies heartburn or acid reflux symtpoms  (+) Abdominal pain  - Patient reports a few days w/o movement, when she does have a BM she will occasionally note rectal bleeding as well  - 2-3 BM's a week, stools are usually soft and denies straining  - Abdominal pain is described as punching in the stomach, and cramping-like pain  - Rectal bleeding occurs 2-3 times a week, blood is only when she wipes and states it is relatively scant  - Patient has not tried anything for her constipation, has attempted to increase water intake w/o much relief  - Denies famhx of colon CA/Polyps, unintentional weight loss, melena.  EGD on 10/10/22 with Dr. Zeb Aguila at [facility] revealed gastritis and esophageal plaques suspicious for candidiasis, stomach bx shows gastric oxyntic and antral type mucosa with chronic inactive inflammation and IM, negative for dysplasia, GE bx shows bacterialcolonization, positive for fungal organisms. EGD on 7/13/21 with Dr. Sandoval revealed white nummular lesions in esophageal, z-line irregular, GE junction polyps, gastritis, bx revealed H. pylori and candida esophagitis, neg. for EOE or Moise's.  Colonoscopy on 4/29/21 with Dr. Sandoval revealed IH, otherwise normal, no specimens collected. Colonoscopy on 1/16/24 with Dr Cruz revealed IH, no specimens collected, screening at age 46. EGD on 1/16/24 unremarkable, bx revealmild active gastritis , neg. for H. pylori   Today, patient reports she developed lower abdominal pain a day and a half ago. Worse than the pain before and describes it as a burning sensation. Pain worsens when she lies down, sits, or breaths. Patient then states she is feeling a lot better today. Patient notes she took a lot of pills that may have precipitated the pain, she took elderberry, plaquenil, probiotics, and danae. Patient notes of occasional bright red blood on the toilet tissue. Patient had fevers 5-6 days ago. BM are regular if she takes her nutrition shake, gets better sleep, avoids fried foods. Now having two BM QD. Denies diarrhea or melena. She was not able to have a colonoscopy and EGD as previously recommended, because she did not have a  but now she does.   Admits to awful reflux with mild odynophagia due to recurrent esophageal candida and thrush, she experienced these symptoms prior. Patient took Flucanazole after last visit and felt better. Denies nausea or vomiting.

## 2024-02-29 ENCOUNTER — OV EP (OUTPATIENT)
Dept: URBAN - METROPOLITAN AREA CLINIC 92 | Facility: CLINIC | Age: 34
End: 2024-02-29

## 2024-02-29 RX ORDER — OMEPRAZOLE 40 MG/1
1 CAPSULE 30 MINUTES BEFORE MORNING MEAL CAPSULE, DELAYED RELEASE ORAL ONCE A DAY
Qty: 30 | Refills: 5 | COMMUNITY
Start: 2022-10-06

## 2024-02-29 RX ORDER — AMITRIPTYLINE HYDROCHLORIDE 25 MG/1
1 TAB QHS; IF NOT BETTER AND NO SIDE EFFECTS INCREASE TO 2 TABS QHS TABLET, FILM COATED ORAL ONCE A DAY
Qty: 60 | Refills: 1 | COMMUNITY
Start: 2021-11-24

## 2024-07-08 ENCOUNTER — OFFICE VISIT (OUTPATIENT)
Dept: URBAN - METROPOLITAN AREA CLINIC 92 | Facility: CLINIC | Age: 34
End: 2024-07-08
Payer: COMMERCIAL

## 2024-07-08 VITALS
TEMPERATURE: 96.3 F | DIASTOLIC BLOOD PRESSURE: 82 MMHG | WEIGHT: 106.4 LBS | SYSTOLIC BLOOD PRESSURE: 102 MMHG | HEIGHT: 64 IN | BODY MASS INDEX: 18.16 KG/M2 | HEART RATE: 104 BPM

## 2024-07-08 DIAGNOSIS — B37.81 CANDIDA ESOPHAGITIS: ICD-10-CM

## 2024-07-08 DIAGNOSIS — K62.5 RECTAL BLEEDING: ICD-10-CM

## 2024-07-08 DIAGNOSIS — R10.84 ABDOMINAL CRAMPING, GENERALIZED: ICD-10-CM

## 2024-07-08 PROCEDURE — 99214 OFFICE O/P EST MOD 30 MIN: CPT

## 2024-07-08 PROCEDURE — 99204 OFFICE O/P NEW MOD 45 MIN: CPT

## 2024-07-08 RX ORDER — OMEPRAZOLE 40 MG/1
1 CAPSULE 30 MINUTES BEFORE MORNING MEAL CAPSULE, DELAYED RELEASE ORAL ONCE A DAY
Qty: 30 | Refills: 5 | COMMUNITY
Start: 2022-10-06

## 2024-07-08 RX ORDER — AMITRIPTYLINE HYDROCHLORIDE 25 MG/1
1 TAB QHS; IF NOT BETTER AND NO SIDE EFFECTS INCREASE TO 2 TABS QHS TABLET, FILM COATED ORAL ONCE A DAY
Qty: 60 | Refills: 1 | COMMUNITY
Start: 2021-11-24

## 2024-07-08 NOTE — PHYSICAL EXAM GASTROINTESTINAL
Abdomen,  soft, nontender, nondistended,  normal bowel sounds. Rectal Exam: No external lesions, no tenderness, no fissure visualized, no blood on examining finger

## 2024-07-08 NOTE — HPI-TODAY'S VISIT:
Patient is a 33 year old female with a PMH of Sjogren's and candidal esophagitis who presents in follow up for abdominal pain. Patient was previously seen in July by MAYTE Bui.   7/06/23 (+) Odynophagia  - Patient reports pain swallowing both solids and liquids, she only gets relief from her pain when she is sleeping, she notes oral thrush currently and states that she has continuously tried to maintain moisture in hr throat  - The last episode of candidal esophagitis was in October and she was treated w/ nystatin  - Decreased appetite and notes weight loss out of fear of inducing pain  - Patient denies heartburn or acid reflux symtpoms  (+) Abdominal pain  - Patient reports a few days w/o movement, when she does have a BM she will occasionally note rectal bleeding as well  - 2-3 BM's a week, stools are usually soft and denies straining  - Abdominal pain is described as punching in the stomach, and cramping-like pain  - Rectal bleeding occurs 2-3 times a week, blood is only when she wipes and states it is relatively scant  - Patient has not tried anything for her constipation, has attempted to increase water intake w/o much relief  - Denies famhx of colon CA/Polyps, unintentional weight loss, melena.  EGD on 10/10/22 with Dr. Zeb Aguila at [facility] revealed gastritis and esophageal plaques suspicious for candidiasis, stomach bx shows gastric oxyntic and antral type mucosa with chronic inactive inflammation and IM, negative for dysplasia, GE bx shows bacterialcolonization, positive for fungal organisms. EGD on 7/13/21 with Dr. Sandoval revealed white nummular lesions in esophageal, z-line irregular, GE junction polyps, gastritis, bx revealed H. pylori and candida esophagitis, neg. for EOE or Moise's.  Colonoscopy on 4/29/21 with Dr. Sandoval revealed IH, otherwise normal, no specimens collected. Colonoscopy on 1/16/24 with Dr. Cruz revealed IH, no specimens collected, screening at age 46. EGD on 1/16/24 unremarkable, bx reveal mild active gastritis , neg. for H. pylori.  Today, patient reports since June she has experienced occasional dizziness, three episodes in one month. Notes of significant amount of hematochezia, last episose was 6 weeks ago. Admits to rectal discomfort. She has a daily BM. Denies melena. Admits to ongoing lower abdominal pain. Notes she was tested for PCOS, results pending. Was also tested for infection by her OBGYN. Has not seen her PCP.  Reports she went to [facility], blood work normal per patient. Denies upper GI issues. Denies constitutional symptoms.

## 2024-07-08 NOTE — PHYSICAL EXAM NECK/THYROID:
normal appearance , no deformities , trachea midline , Thyroid normal size , no masses Gi bleed, anemia

## 2024-07-31 ENCOUNTER — DASHBOARD ENCOUNTERS (OUTPATIENT)
Age: 34
End: 2024-07-31

## 2024-08-01 ENCOUNTER — OFFICE VISIT (OUTPATIENT)
Dept: URBAN - METROPOLITAN AREA CLINIC 92 | Facility: CLINIC | Age: 34
End: 2024-08-01

## 2024-08-01 RX ORDER — AMITRIPTYLINE HYDROCHLORIDE 25 MG/1
1 TAB QHS; IF NOT BETTER AND NO SIDE EFFECTS INCREASE TO 2 TABS QHS TABLET, FILM COATED ORAL ONCE A DAY
Qty: 60 | Refills: 1 | Status: ACTIVE | COMMUNITY
Start: 2021-11-24

## 2024-08-01 RX ORDER — OMEPRAZOLE 40 MG/1
1 CAPSULE 30 MINUTES BEFORE MORNING MEAL CAPSULE, DELAYED RELEASE ORAL ONCE A DAY
Qty: 30 | Refills: 5 | Status: ACTIVE | COMMUNITY
Start: 2022-10-06
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
## 2024-10-09 ENCOUNTER — OFFICE VISIT (OUTPATIENT)
Dept: URBAN - METROPOLITAN AREA CLINIC 25 | Facility: CLINIC | Age: 34
End: 2024-10-09
Payer: COMMERCIAL

## 2024-10-09 ENCOUNTER — TELEPHONE ENCOUNTER (OUTPATIENT)
Dept: URBAN - METROPOLITAN AREA CLINIC 25 | Facility: CLINIC | Age: 34
End: 2024-10-09

## 2024-10-09 VITALS
BODY MASS INDEX: 19.29 KG/M2 | SYSTOLIC BLOOD PRESSURE: 104 MMHG | DIASTOLIC BLOOD PRESSURE: 67 MMHG | WEIGHT: 113 LBS | HEART RATE: 84 BPM | HEIGHT: 64 IN

## 2024-10-09 DIAGNOSIS — B37.81 ESOPHAGEAL CANDIDIASIS: ICD-10-CM

## 2024-10-09 DIAGNOSIS — K60.0 ACUTE ANAL FISSURE: ICD-10-CM

## 2024-10-09 DIAGNOSIS — R10.12 LEFT UPPER QUADRANT ABDOMINAL PAIN: ICD-10-CM

## 2024-10-09 DIAGNOSIS — D84.9 IMMUNOSUPPRESSION: ICD-10-CM

## 2024-10-09 DIAGNOSIS — K62.5 RECTAL BLEEDING: ICD-10-CM

## 2024-10-09 PROCEDURE — 99214 OFFICE O/P EST MOD 30 MIN: CPT | Performed by: INTERNAL MEDICINE

## 2024-10-09 RX ORDER — AMITRIPTYLINE HYDROCHLORIDE 25 MG/1
1 TAB QHS; IF NOT BETTER AND NO SIDE EFFECTS INCREASE TO 2 TABS QHS TABLET, FILM COATED ORAL ONCE A DAY
Qty: 60 | Refills: 1 | Status: ACTIVE | COMMUNITY
Start: 2021-11-24

## 2024-10-09 RX ORDER — OMEPRAZOLE 40 MG/1
1 CAPSULE 30 MINUTES BEFORE MORNING MEAL CAPSULE, DELAYED RELEASE ORAL ONCE A DAY
Qty: 30 | Refills: 5 | Status: ACTIVE | COMMUNITY
Start: 2022-10-06

## 2024-10-09 RX ORDER — LIDOCAINE 40 MG/G
1 APPLICATION AS NEEDED CREAM TOPICAL
Qty: 30 GRAM | Refills: 1 | OUTPATIENT
Start: 2024-10-09

## 2024-10-09 RX ORDER — LIDOCAINE 40 MG/G
1 APPLICATION AS NEEDED CREAM TOPICAL
Qty: 30 GRAM | Refills: 1
Start: 2024-10-09

## 2024-10-09 NOTE — HPI-TODAY'S VISIT:
Patient last seen 7/2024 at the [facility] office.  She was unable to get the MRI that was ordered.  A few days ago she had increased BM's and she felt a sharp pain in the rectal area.  She had some rectal bleed with BRBPR. She did not have a protrusion.  SHe had a tearing pain.  She feels better today.  SHe had some pain with a BM yesterday.  She has chronic abdominal pain.  This is a new pain that developed in the pat few days.  Her appetite is improved and she denies weight loss.  She denies UGI symptoms.  She denies recurrent dysphagia or odynophagia.  She has not had prior anal fissure.  Colonoscopy in 1/2024 was normal

## 2024-10-16 ENCOUNTER — OFFICE VISIT (OUTPATIENT)
Dept: URBAN - METROPOLITAN AREA CLINIC 25 | Facility: CLINIC | Age: 34
End: 2024-10-16

## 2024-10-30 ENCOUNTER — TELEPHONE ENCOUNTER (OUTPATIENT)
Dept: URBAN - METROPOLITAN AREA CLINIC 98 | Facility: CLINIC | Age: 34
End: 2024-10-30

## 2024-11-11 ENCOUNTER — OUT OF OFFICE VISIT (OUTPATIENT)
Dept: URBAN - METROPOLITAN AREA SURGERY CENTER 16 | Facility: SURGERY CENTER | Age: 34
End: 2024-11-11

## 2024-12-05 ENCOUNTER — TELEPHONE ENCOUNTER (OUTPATIENT)
Dept: URBAN - METROPOLITAN AREA CLINIC 92 | Facility: CLINIC | Age: 34
End: 2024-12-05

## 2024-12-05 PROBLEM — 72042002: Status: ACTIVE | Noted: 2024-12-05

## 2024-12-07 ENCOUNTER — LAB OUTSIDE AN ENCOUNTER (OUTPATIENT)
Dept: URBAN - METROPOLITAN AREA CLINIC 92 | Facility: CLINIC | Age: 34
End: 2024-12-07

## 2024-12-09 LAB
ADENOVIRUS F 40/41: NOT DETECTED
CAMPYLOBACTER: NOT DETECTED
CLOSTRIDIUM DIFFICILE: NOT DETECTED
ENTAMOEBA HISTOLYTICA: NOT DETECTED
ENTEROAGGREGATIVE E.COLI: NOT DETECTED
ENTEROTOXIGENIC E.COLI: NOT DETECTED
ESCHERICHIA COLI O157: NOT DETECTED
GIARDIA LAMBLIA: NOT DETECTED
NOROVIRUS GI/GII: NOT DETECTED
ROTAVIRUS A: NOT DETECTED
SALMONELLA SPP.: NOT DETECTED
SHIGA-LIKE TOXIN PRODUCING E.COLI: NOT DETECTED
SHIGELLA SPP. / ENTEROINVASIVE E.COLI: NOT DETECTED
VIBRIO PARAHAEMOLYTICUS: NOT DETECTED
VIBRIO SPP.: NOT DETECTED
YERSINIA ENTEROCOLITICA: NOT DETECTED

## 2024-12-17 ENCOUNTER — OFFICE VISIT (OUTPATIENT)
Dept: URBAN - METROPOLITAN AREA CLINIC 92 | Facility: CLINIC | Age: 34
End: 2024-12-17

## 2025-01-02 ENCOUNTER — OFFICE VISIT (OUTPATIENT)
Dept: URBAN - METROPOLITAN AREA CLINIC 92 | Facility: CLINIC | Age: 35
End: 2025-01-02
Payer: COMMERCIAL

## 2025-01-02 VITALS
HEIGHT: 64 IN | BODY MASS INDEX: 21.51 KG/M2 | SYSTOLIC BLOOD PRESSURE: 104 MMHG | HEART RATE: 80 BPM | TEMPERATURE: 96.4 F | DIASTOLIC BLOOD PRESSURE: 67 MMHG | WEIGHT: 126 LBS

## 2025-01-02 DIAGNOSIS — B37.81 ESOPHAGEAL CANDIDIASIS: ICD-10-CM

## 2025-01-02 DIAGNOSIS — K82.4 GALLBLADDER POLYP: ICD-10-CM

## 2025-01-02 DIAGNOSIS — K59.04 CHRONIC IDIOPATHIC CONSTIPATION: ICD-10-CM

## 2025-01-02 DIAGNOSIS — K62.5 RECTAL BLEEDING: ICD-10-CM

## 2025-01-02 DIAGNOSIS — K21.9 GASTROESOPHAGEAL REFLUX DISEASE, UNSPECIFIED WHETHER ESOPHAGITIS PRESENT: ICD-10-CM

## 2025-01-02 DIAGNOSIS — R13.10 ODYNOPHAGIA: ICD-10-CM

## 2025-01-02 DIAGNOSIS — M35.08 SJOGREN SYNDROME WITH GASTROINTESTINAL INVOLVEMENT: ICD-10-CM

## 2025-01-02 DIAGNOSIS — R10.30 LOWER ABDOMINAL PAIN: ICD-10-CM

## 2025-01-02 PROBLEM — 235595009: Status: ACTIVE | Noted: 2025-01-02

## 2025-01-02 PROBLEM — 197433003: Status: ACTIVE | Noted: 2025-01-02

## 2025-01-02 PROCEDURE — 99213 OFFICE O/P EST LOW 20 MIN: CPT

## 2025-01-02 RX ORDER — LINACLOTIDE 145 UG/1
1 CAPSULE AT LEAST 30 MINUTES BEFORE THE FIRST MEAL OF THE DAY ON AN EMPTY STOMACH CAPSULE, GELATIN COATED ORAL ONCE A DAY
Qty: 90 | Refills: 3 | OUTPATIENT
Start: 2025-01-02 | End: 2025-12-28

## 2025-01-02 RX ORDER — OMEPRAZOLE 40 MG/1
1 CAPSULE 30 MINUTES BEFORE MORNING MEAL CAPSULE, DELAYED RELEASE ORAL ONCE A DAY
Qty: 30 | Refills: 5 | Status: ACTIVE | COMMUNITY
Start: 2022-10-06

## 2025-01-02 RX ORDER — LIDOCAINE 40 MG/G
1 APPLICATION AS NEEDED CREAM TOPICAL
Qty: 30 GRAM | Refills: 1 | Status: ON HOLD | COMMUNITY
Start: 2024-10-09

## 2025-01-02 RX ORDER — AMITRIPTYLINE HYDROCHLORIDE 25 MG/1
1 TAB QHS; IF NOT BETTER AND NO SIDE EFFECTS INCREASE TO 2 TABS QHS TABLET, FILM COATED ORAL ONCE A DAY
Qty: 60 | Refills: 1 | Status: ON HOLD | COMMUNITY
Start: 2021-11-24

## 2025-01-02 RX ORDER — OMEPRAZOLE 40 MG/1
1 CAPSULE 30 MINUTES BEFORE MORNING MEAL CAPSULE, DELAYED RELEASE ORAL ONCE A DAY
Qty: 90 | Refills: 3 | OUTPATIENT
Start: 2025-01-02

## 2025-01-02 NOTE — HPI-TODAY'S VISIT:
Patient is a 34 year old female with a PMH of Sjogren's and candidal esophagitis who presents in follow up for abdominal pain. Patient was previously seen in July by MAYTE Bui.   7/06/23 (+) Odynophagia  - Patient reports pain swallowing both solids and liquids, she only gets relief from her pain when she is sleeping, she notes oral thrush currently and states that she has continuously tried to maintain moisture in hr throat  - The last episode of candidal esophagitis was in October and she was treated w/ nystatin  - Decreased appetite and notes weight loss out of fear of inducing pain  - Patient denies heartburn or acid reflux symtpoms  (+) Abdominal pain  - Patient reports a few days w/o movement, when she does have a BM she will occasionally note rectal bleeding as well  - 2-3 BM's a week, stools are usually soft and denies straining  - Abdominal pain is described as punching in the stomach, and cramping-like pain  - Rectal bleeding occurs 2-3 times a week, blood is only when she wipes and states it is relatively scant  - Patient has not tried anything for her constipation, has attempted to increase water intake w/o much relief  - Denies famhx of colon CA/Polyps, unintentional weight loss, melena.  EGD on 10/10/22 with Dr. Zeb Aguila at [facility] revealed gastritis and esophageal plaques suspicious for candidiasis, stomach bx shows gastric oxyntic and antral type mucosa with chronic inactive inflammation and IM, negative for dysplasia, GE bx shows bacterialcolonization, positive for fungal organisms. EGD on 7/13/21 with Dr. Sandoval revealed white nummular lesions in esophageal, z-line irregular, GE junction polyps, gastritis, bx revealed H. pylori and candida esophagitis, neg. for EOE or Moise's.  Colonoscopy on 4/29/21 with Dr. Sandoval revealed IH, otherwise normal, no specimens collected. Colonoscopy on 1/16/24 with Dr. Cruz revealed IH, no specimens collected, screening at age 46. EGD on 1/16/24 unremarkable, bx reveal mild active gastritis , neg. for H. pylori.  7/08/24, patient reports since June she has experienced occasional dizziness, three episodes in one month. Notes of significant amount of hematochezia, last episose was 6 weeks ago. Admits to rectal discomfort. She has a daily BM. Denies melena. Admits to ongoing lower abdominal pain. Notes she was tested for PCOS, results pending. Was also tested for infection by her OBGYN. Has not seen her PCP.  Reports she went to [facility], blood work normal per patient. Denies upper GI issues. Denies constitutional symptoms.  Today, patient reports she feels better. Her abdominal pain resolved. She called 12/2204 with c/o abdominal pain and diarrhea. GPP study negative. MRI 11/24/24 revealed a 3mm GB polyp, trace pleural effusions, no acute findings. She does continue to struggle with constipation. She has a BM QOD but has to take herbal medication to help. She was treated for an anal fissure by Dr. Marcus Giraldo 10/2024 so her rectal pain and hematochezia resolved. Patient admits to reflux, not currently on a PPI. Denies dysphagia, odynophagia, nausea, or vomiting.

## 2025-01-15 ENCOUNTER — OFFICE VISIT (OUTPATIENT)
Dept: URBAN - METROPOLITAN AREA CLINIC 25 | Facility: CLINIC | Age: 35
End: 2025-01-15

## 2025-05-19 NOTE — PHYSICAL EXAM PSYCH:
normal mood with appropriate affect
Additional Notes: Administered 60 units of dysport to the forehead. If results are not to pt’s liking, she will return in a week to get more units.
Detail Level: Simple
Render Risk Assessment In Note?: no